# Patient Record
Sex: FEMALE | Race: BLACK OR AFRICAN AMERICAN | URBAN - NONMETROPOLITAN AREA
[De-identification: names, ages, dates, MRNs, and addresses within clinical notes are randomized per-mention and may not be internally consistent; named-entity substitution may affect disease eponyms.]

---

## 2017-06-08 NOTE — PATIENT DISCUSSION
*Cataract Counseling: The patient's cataracts are becoming visually significant. Patient desires not to proceed with surgery at this time. I have discussed continuing with current spectacles versus updating. I have offered the patient a new spectacle prescription to fill if desires. Return to follow up as scheduled or sooner if symptoms arise.

## 2018-06-20 NOTE — PATIENT DISCUSSION
Surgery Counseling:  I have discussed the option of glasses versus cataract surgery versus following, It was explained that when vision no longer meets the patient's visual needs and a new prescription for glasses is not likely to improve the patient's visual symptoms, the option of cataract surgery is a reasonable next step. It was explained that there is no guarantee that removing the cataract will improve their visual symptoms; however, it is believed that the cataract is contributing to the patient's visual impairment and surgery may noticeably improve both the visual and functional status of the patient. After this discussion, the patient desires to proceed with cataract surgery with implantation of an intraocular lens to improve their vision and to reduce glare to improve night driving.

## 2018-06-20 NOTE — PATIENT DISCUSSION
CATARACT, OU - VISUALLY SIGNIFICANT. SCHEDULE PHACO WITH IOL OS FIRST THEN OD IF VISUAL SYMPTOMS PERSIST. GLASSES RX GIVEN TO FILL IF DESIRES IN THE EVENT PATIENT DOES NOT PROCEED WITH SURGERY.

## 2018-07-25 NOTE — PATIENT DISCUSSION
New Prescription: Prolensa (bromfenac): drops: 0.07% 1 drop every morning as directed into left eye 07-

## 2018-07-25 NOTE — PATIENT DISCUSSION
New Prescription: Durezol (difluprednate): drops: 0.05% 1 drop three times a day as directed into left eye 07-

## 2018-07-25 NOTE — PATIENT DISCUSSION
New Prescription: Besivance (besifloxacin): drops,suspension: 0.6% 1 drop three times a day as directed into left eye 07-

## 2018-08-15 NOTE — PATIENT DISCUSSION
Continue: Durezol (difluprednate): drops: 0.05% 1 drop three times a day as directed into left eye 07-

## 2018-08-15 NOTE — PATIENT DISCUSSION
Continue: Besivance (besifloxacin): drops,suspension: 0.6% 1 drop three times a day as directed into left eye 07-

## 2018-08-20 NOTE — PATIENT DISCUSSION
Continue as directed in the left eye for 2 more days, then stop. Start in the right eye 2 days prior to surgery and continue as directed.

## 2018-08-20 NOTE — PATIENT DISCUSSION
Continue as directed in the left eye. Start 5 days prior to surgery in the right eye and continue as directed.

## 2018-08-20 NOTE — PATIENT DISCUSSION
Taper as directed in the left eye. Start in the right eye immediately after surgery and continue as directed.

## 2018-09-19 NOTE — PATIENT DISCUSSION
Post-Op Instructions OD: Durezol 1 time per day for 1 week, then discontinue. Prolensa 1 time per day for 1 week, then discontinue.

## 2022-07-21 ENCOUNTER — NEW PATIENT (OUTPATIENT)
Dept: URBAN - NONMETROPOLITAN AREA CLINIC 1 | Facility: CLINIC | Age: 37
End: 2022-07-21

## 2022-07-21 DIAGNOSIS — H52.13: ICD-10-CM

## 2022-07-21 PROCEDURE — 92310 CONTACT LENS FITTING OU: CPT

## 2022-07-21 PROCEDURE — S0620 ROUTINE OPHTHALMOLOGICAL EXA: HCPCS

## 2022-07-21 ASSESSMENT — KERATOMETRY
OD_K1POWER_DIOPTERS: 42.50
OS_K1POWER_DIOPTERS: 42.25
OS_AXISANGLE_DEGREES: 112
OS_K2POWER_DIOPTERS: 42.75
OS_AXISANGLE2_DEGREES: 022
OD_K2POWER_DIOPTERS: 42.75
OD_AXISANGLE_DEGREES: 66
OD_AXISANGLE2_DEGREES: 156

## 2022-07-21 ASSESSMENT — TONOMETRY
OD_IOP_MMHG: 17
OS_IOP_MMHG: 18

## 2022-07-21 ASSESSMENT — VISUAL ACUITY
OS_CC: 20/20
OD_CC: 20/20

## 2024-07-18 ENCOUNTER — COMPREHENSIVE EXAM (OUTPATIENT)
Dept: URBAN - NONMETROPOLITAN AREA CLINIC 1 | Facility: CLINIC | Age: 39
End: 2024-07-18

## 2024-07-18 DIAGNOSIS — H52.13: ICD-10-CM

## 2024-07-18 PROCEDURE — 92015 DETERMINE REFRACTIVE STATE: CPT

## 2024-07-18 PROCEDURE — 92014 COMPRE OPH EXAM EST PT 1/>: CPT

## 2024-07-18 ASSESSMENT — VISUAL ACUITY
OS_CC: 20/20
OD_CC: 20/20
OU_CC: 20/20

## 2024-07-18 ASSESSMENT — KERATOMETRY
OS_K1POWER_DIOPTERS: 42.25
OS_AXISANGLE2_DEGREES: 022
OS_AXISANGLE_DEGREES: 112
OD_K2POWER_DIOPTERS: 42.75
OS_K2POWER_DIOPTERS: 42.75
OD_AXISANGLE2_DEGREES: 156
OD_K1POWER_DIOPTERS: 42.50
OD_AXISANGLE_DEGREES: 66

## 2024-07-18 ASSESSMENT — TONOMETRY
OS_IOP_MMHG: 18
OD_IOP_MMHG: 18